# Patient Record
Sex: MALE | NOT HISPANIC OR LATINO | Employment: OTHER | URBAN - NONMETROPOLITAN AREA
[De-identification: names, ages, dates, MRNs, and addresses within clinical notes are randomized per-mention and may not be internally consistent; named-entity substitution may affect disease eponyms.]

---

## 2020-06-15 ENCOUNTER — OFFICE VISIT CONVERTED (OUTPATIENT)
Dept: FAMILY MEDICINE CLINIC | Age: 75
End: 2020-06-15
Attending: FAMILY MEDICINE

## 2020-06-16 ENCOUNTER — HOSPITAL ENCOUNTER (OUTPATIENT)
Dept: OTHER | Facility: HOSPITAL | Age: 75
Discharge: HOME | End: 2020-06-16
Attending: FAMILY MEDICINE

## 2020-06-17 LAB — SARS-COV-2 RNA SPEC QL NAA+PROBE: NOT DETECTED

## 2021-02-08 ENCOUNTER — IMMUNIZATION (OUTPATIENT)
Dept: VACCINE CLINIC | Facility: HOSPITAL | Age: 76
End: 2021-02-08

## 2021-02-08 PROCEDURE — 0001A: CPT | Performed by: INTERNAL MEDICINE

## 2021-02-08 PROCEDURE — 91300 HC SARSCOV02 VAC 30MCG/0.3ML IM: CPT | Performed by: INTERNAL MEDICINE

## 2021-03-01 ENCOUNTER — IMMUNIZATION (OUTPATIENT)
Dept: VACCINE CLINIC | Facility: HOSPITAL | Age: 76
End: 2021-03-01

## 2021-03-01 PROCEDURE — 91300 HC SARSCOV02 VAC 30MCG/0.3ML IM: CPT | Performed by: INTERNAL MEDICINE

## 2021-03-01 PROCEDURE — 0002A: CPT | Performed by: INTERNAL MEDICINE

## 2021-05-18 NOTE — PROGRESS NOTES
"Jeevan Gilmore  1945     Office/Outpatient Visit    Visit Date: Mon, Alex 15, 2020 04:18 pm    Provider: Jayjay Arthur MD (Assistant: Alie Landrum MA)    Location: [facility]        Electronically signed by Jayjay Arthur MD on  06/15/2020 11:15:34 PM                             Subjective:        CC: [phone] - dox video Eloise is a 75 year old male.  \"just wants a covid test\"         HPI: TELEMEDICINE VISIT:    - Patient consented to telemedicine visit and billing    - Persons on the call include:  myself, patient,     - The call was conducted via:[employer] video      Eloise is wanting to get Covid testing.  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  He has no known exposures, but he is a social animal.  No cough or fever.    ROS:     CONSTITUTIONAL:  Negative for chills, fatigue and fever.      CARDIOVASCULAR:  Negative for chest pain, orthopnea, paroxysmal nocturnal dyspnea and pedal edema.      RESPIRATORY:  Negative for dyspnea and cough.      GASTROINTESTINAL:  Negative for abdominal pain, heartburn, constipation, diarrhea, and stool changes.      GENITOURINARY:  Negative for dysuria and polyuria.      PSYCHIATRIC:  Negative for anxiety and depression.          Allergies:     No Known Allergies.        Current Medications:     Synthroid     Paxil         Objective:        Exams: On the video telehealth visit patient looked well.  No acute distress.  Color look good.  Eyes were nonicteric.  Had no evidence of respiratory infection.  No cough or congestion.  Patient was up and ambulatory.  Mental health seem good.  Has good insight into the coronavirus issues.        Assessment:         Z11.59   Encounter for screening for other viral diseases           ORDERS:         Radiology/Test Orders:       [id number]  COVID 19 Testing  (Send-Out)                      Plan:         Encounter for screening for other viral diseasesWill check covid " test.     LABORATORY:  Labs ordered to be performed today include COVID 19 Testing.            Orders:       [id number]  COVID 19 Testing  (Send-Out)                  Charge Capture:         Primary Diagnosis:     Z11.59  Encounter for screening for other viral diseases           Orders:      [id number]  Office visit - new pt, level 2  (In-House)